# Patient Record
Sex: MALE | Race: WHITE | NOT HISPANIC OR LATINO
[De-identification: names, ages, dates, MRNs, and addresses within clinical notes are randomized per-mention and may not be internally consistent; named-entity substitution may affect disease eponyms.]

---

## 2022-12-14 PROBLEM — Z00.00 ENCOUNTER FOR PREVENTIVE HEALTH EXAMINATION: Status: ACTIVE | Noted: 2022-12-14

## 2022-12-19 ENCOUNTER — NON-APPOINTMENT (OUTPATIENT)
Age: 67
End: 2022-12-19

## 2022-12-20 ENCOUNTER — APPOINTMENT (OUTPATIENT)
Dept: OTOLARYNGOLOGY | Facility: CLINIC | Age: 67
End: 2022-12-20

## 2022-12-20 VITALS — WEIGHT: 172 LBS | TEMPERATURE: 97.3 F | HEIGHT: 70 IN | BODY MASS INDEX: 24.62 KG/M2

## 2022-12-20 DIAGNOSIS — H93.13 TINNITUS, BILATERAL: ICD-10-CM

## 2022-12-20 DIAGNOSIS — Z85.820 PERSONAL HISTORY OF MALIGNANT MELANOMA OF SKIN: ICD-10-CM

## 2022-12-20 DIAGNOSIS — Z86.79 PERSONAL HISTORY OF OTHER DISEASES OF THE CIRCULATORY SYSTEM: ICD-10-CM

## 2022-12-20 DIAGNOSIS — Z87.2 PERSONAL HISTORY OF DISEASES OF THE SKIN AND SUBCUTANEOUS TISSUE: ICD-10-CM

## 2022-12-20 DIAGNOSIS — Z85.46 PERSONAL HISTORY OF MALIGNANT NEOPLASM OF PROSTATE: ICD-10-CM

## 2022-12-20 DIAGNOSIS — H90.3 SENSORINEURAL HEARING LOSS, BILATERAL: ICD-10-CM

## 2022-12-20 DIAGNOSIS — Z72.89 OTHER PROBLEMS RELATED TO LIFESTYLE: ICD-10-CM

## 2022-12-20 PROCEDURE — 92567 TYMPANOMETRY: CPT

## 2022-12-20 PROCEDURE — 99203 OFFICE O/P NEW LOW 30 MIN: CPT

## 2022-12-20 PROCEDURE — 92557 COMPREHENSIVE HEARING TEST: CPT

## 2022-12-20 RX ORDER — ZOLPIDEM TARTRATE 10 MG/1
10 TABLET ORAL
Qty: 30 | Refills: 0 | Status: DISCONTINUED | COMMUNITY
Start: 2022-06-24

## 2022-12-20 RX ORDER — DESONIDE 0.5 MG/G
0.05 CREAM TOPICAL
Qty: 30 | Refills: 0 | Status: DISCONTINUED | COMMUNITY
Start: 2022-11-06

## 2022-12-20 RX ORDER — LOSARTAN POTASSIUM 50 MG/1
50 TABLET, FILM COATED ORAL
Qty: 90 | Refills: 0 | Status: ACTIVE | COMMUNITY
Start: 2022-10-04

## 2022-12-20 RX ORDER — ATENOLOL 50 MG/1
50 TABLET ORAL
Qty: 90 | Refills: 0 | Status: ACTIVE | COMMUNITY
Start: 2022-05-22

## 2022-12-20 RX ORDER — ECONAZOLE NITRATE 10 MG/G
1 CREAM TOPICAL
Qty: 30 | Refills: 0 | Status: ACTIVE | COMMUNITY
Start: 2022-11-06

## 2022-12-20 RX ORDER — LOSARTAN POTASSIUM 25 MG/1
25 TABLET, FILM COATED ORAL
Qty: 90 | Refills: 0 | Status: DISCONTINUED | COMMUNITY
Start: 2022-07-30

## 2022-12-20 RX ORDER — SILDENAFIL 20 MG/1
TABLET ORAL
Refills: 0 | Status: ACTIVE | COMMUNITY

## 2022-12-20 RX ORDER — TRIAMCINOLONE ACETONIDE 1 MG/G
0.1 OINTMENT TOPICAL
Qty: 30 | Refills: 0 | Status: ACTIVE | COMMUNITY
Start: 2022-12-08

## 2022-12-20 RX ORDER — HYDROCHLOROTHIAZIDE 12.5 MG/1
12.5 CAPSULE ORAL
Qty: 90 | Refills: 0 | Status: ACTIVE | COMMUNITY
Start: 2022-12-11

## 2022-12-20 RX ORDER — ATORVASTATIN CALCIUM 20 MG/1
20 TABLET, FILM COATED ORAL
Qty: 90 | Refills: 0 | Status: ACTIVE | COMMUNITY
Start: 2022-05-22

## 2022-12-20 NOTE — DATA REVIEWED
[de-identified] : In light of the patients current symptoms, Complete audiometry was ordered and completed today. I have interpreted these results and reviewed them in detail with the patient.\par \par Bilateral sensorineural hearing loss in the mid to high frequencies.  Moderate severity.  Speech recognition intact.

## 2022-12-20 NOTE — CONSULT LETTER
[Please see my note below.] : Please see my note below. [FreeTextEntry2] : Dear BEVERLEY FERNANDEZ  [FreeTextEntry1] : Thank you for allowing me to participate in the care of JANKI OSMAN .\par Please see the attached visit note.\par \par \par \par Indra Bess\par Otology\par Medical Director of [facility]\par Department of Otolaryngology\par [facility]

## 2022-12-22 ENCOUNTER — TRANSCRIPTION ENCOUNTER (OUTPATIENT)
Age: 67
End: 2022-12-22

## 2022-12-22 PROBLEM — H90.3 SENSORINEURAL HEARING LOSS (SNHL) OF BOTH EARS: Status: ACTIVE | Noted: 2022-12-20

## 2022-12-29 ENCOUNTER — APPOINTMENT (OUTPATIENT)
Dept: OTOLARYNGOLOGY | Facility: CLINIC | Age: 67
End: 2022-12-29
Payer: SELF-PAY

## 2022-12-29 PROCEDURE — V5010 ASSESSMENT FOR HEARING AID: CPT

## 2023-01-03 ENCOUNTER — TRANSCRIPTION ENCOUNTER (OUTPATIENT)
Age: 68
End: 2023-01-03

## 2023-02-22 ENCOUNTER — OFFICE (OUTPATIENT)
Dept: URBAN - METROPOLITAN AREA CLINIC 29 | Facility: CLINIC | Age: 68
Setting detail: OPHTHALMOLOGY
End: 2023-02-22
Payer: MEDICARE

## 2023-02-22 DIAGNOSIS — H01.004: ICD-10-CM

## 2023-02-22 DIAGNOSIS — H01.005: ICD-10-CM

## 2023-02-22 DIAGNOSIS — H01.002: ICD-10-CM

## 2023-02-22 DIAGNOSIS — H01.001: ICD-10-CM

## 2023-02-22 DIAGNOSIS — H02.412: ICD-10-CM

## 2023-02-22 PROCEDURE — 92014 COMPRE OPH EXAM EST PT 1/>: CPT | Performed by: OPHTHALMOLOGY

## 2023-02-22 ASSESSMENT — REFRACTION_MANIFEST
OD_CYLINDER: +1.00
OS_AXIS: 90
OS_ADD: +2.50
OS_SPHERE: -1.00
OS_CYLINDER: +2.25
OS_VA1: 20/20-
OD_AXIS: 90
OD_VA1: 20/20-
OD_ADD: +2.50
OD_SPHERE: PLANO

## 2023-02-22 ASSESSMENT — REFRACTION_AUTOREFRACTION
OS_SPHERE: -0.75
OD_CYLINDER: +1.00
OS_CYLINDER: +2.25
OD_SPHERE: +0.25
OS_AXIS: 91
OD_AXIS: 82

## 2023-02-22 ASSESSMENT — CONFRONTATIONAL VISUAL FIELD TEST (CVF)
OS_FINDINGS: FULL
OD_FINDINGS: FULL

## 2023-02-22 ASSESSMENT — REFRACTION_CURRENTRX
OS_VPRISM_DIRECTION: SV
OD_SPHERE: +2.50
OS_CYLINDER: +1.25
OS_CYLINDER: +1.25
OS_AXIS: 86
OS_OVR_VA: 20/
OS_OVR_VA: 20/
OS_AXIS: 89
OD_OVR_VA: 20/
OS_SPHERE: +1.75
OD_CYLINDER: +1.00
OD_VPRISM_DIRECTION: SV
OD_AXIS: 86
OD_AXIS: 82
OD_SPHERE: +2.50
OD_OVR_VA: 20/
OD_CYLINDER: +1.00
OS_SPHERE: +2.00

## 2023-02-22 ASSESSMENT — KERATOMETRY
OD_K1POWER_DIOPTERS: 43.50
OS_AXISANGLE_DEGREES: 97
OS_K2POWER_DIOPTERS: 46.00
OD_AXISANGLE_DEGREES: 70
OS_K1POWER_DIOPTERS: 43.25
OD_K2POWER_DIOPTERS: 45.00

## 2023-02-22 ASSESSMENT — SPHEQUIV_DERIVED
OS_SPHEQUIV: 0.125
OD_SPHEQUIV: 0.75
OS_SPHEQUIV: 0.375

## 2023-02-22 ASSESSMENT — LID POSITION - PTOSIS: OS_PTOSIS: LUL 2+

## 2023-02-22 ASSESSMENT — AXIALLENGTH_DERIVED
OS_AL: 23.1389
OS_AL: 23.0455
OD_AL: 23.0376

## 2023-02-22 ASSESSMENT — VISUAL ACUITY
OS_BCVA: 20/25-1
OD_BCVA: 20/50-

## 2023-02-22 ASSESSMENT — LID EXAM ASSESSMENTS
OS_BLEPHARITIS: LLL LUL 2+
OD_BLEPHARITIS: RLL RUL 2+

## 2023-02-22 ASSESSMENT — LID POSITION - DERMATOCHALASIS
OD_DERMATOCHALASIS: RUL 2+
OS_DERMATOCHALASIS: LUL 2+